# Patient Record
Sex: MALE | Race: WHITE | NOT HISPANIC OR LATINO | URBAN - METROPOLITAN AREA
[De-identification: names, ages, dates, MRNs, and addresses within clinical notes are randomized per-mention and may not be internally consistent; named-entity substitution may affect disease eponyms.]

---

## 2017-01-12 ENCOUNTER — HOSPITAL ENCOUNTER (OUTPATIENT)
Dept: OCCUPATIONAL THERAPY | Facility: CLINIC | Age: 42
Discharge: HOME | End: 2017-01-12
Attending: OCCUPATIONAL THERAPIST | Admitting: OCCUPATIONAL THERAPIST
Payer: COMMERCIAL

## 2017-01-12 ENCOUNTER — ALLIED HEALTH/NURSE VISIT (OUTPATIENT)
Dept: OPHTHALMOLOGY | Facility: CLINIC | Age: 42
End: 2017-01-12
Attending: OPHTHALMOLOGY
Payer: COMMERCIAL

## 2017-01-12 DIAGNOSIS — H51.9 CONVERGENCE INSUFFICIENCY OR PALSY IN BINOCULAR EYE MOVEMENT: Primary | ICD-10-CM

## 2017-01-12 PROCEDURE — 92015 DETERMINE REFRACTIVE STATE: CPT | Mod: ZF

## 2017-01-12 PROCEDURE — 99215 OFFICE O/P EST HI 40 MIN: CPT | Mod: 25,ZF

## 2017-01-12 PROCEDURE — 97530 THERAPEUTIC ACTIVITIES: CPT | Mod: GO | Performed by: OCCUPATIONAL THERAPIST

## 2017-01-12 PROCEDURE — 40000249 ZZH STATISTIC VISIT LOW VISION CLINIC: Performed by: OCCUPATIONAL THERAPIST

## 2017-01-12 PROCEDURE — 97535 SELF CARE MNGMENT TRAINING: CPT | Mod: GO,59 | Performed by: OCCUPATIONAL THERAPIST

## 2017-01-12 ASSESSMENT — VISUAL ACUITY
OS_PH_SC: 20/20
METHOD: SNELLEN - LINEAR
OD_SC: 20/50
OS_SC: 20/30
OD_PH_SC: 20/20

## 2017-01-12 ASSESSMENT — CONF VISUAL FIELD
OS_NORMAL: 1
OD_NORMAL: 1

## 2017-01-12 ASSESSMENT — REFRACTION_MANIFEST
OS_SPHERE: -1.50
OD_CYLINDER: +0.50
OD_SPHERE: -1.50
OS_CYLINDER: +0.25
OD_AXIS: 060
OS_AXIS: 180

## 2017-01-12 NOTE — NURSING NOTE
Chief Complaints and History of Present Illnesses   Patient presents with     Eye Problem     REFRACTION AND PRISMS     HPI    Affected eye(s):  Both   Symptoms:     Blurred vision   Decreased vision      Frequency:  Constant       Do you have eye pain now?:  No      Comments:  Ramon Holcomb is a 41 year old male with the following diagnoses:      1.  Optic atrophy    2.  Subjective visual disturbance        - Saw Deneen Izabella today.   - Here for refraction and prism fitting  - blurred vision OU.   - decreased contrast   - no diplopia per patient account.

## 2017-01-16 ASSESSMENT — TONOMETRY
OS_IOP_MMHG: 22
IOP_METHOD: TONOPEN
OD_IOP_MMHG: 20

## 2017-01-16 ASSESSMENT — REFRACTION_WEARINGRX
OS_AXIS: 005
OS_HPRISM: 1 BI
OD_SPHERE: -1.00
OS_CYLINDER: +0.50
OD_CYLINDER: +0.50
OS_SPHERE: -1.00
OD_AXIS: 023

## 2017-03-07 ENCOUNTER — TELEPHONE (OUTPATIENT)
Dept: OCCUPATIONAL THERAPY | Facility: CLINIC | Age: 42
End: 2017-03-07

## 2017-05-09 NOTE — PROGRESS NOTES
OCCUPATIONAL THERAPY VISUAL REHABILITATION DISCHARGE SUMMARY     Patient: Drake Holcomb  : 1975  Insurance:   Payor/Plan Subscriber Name Rel Member # Group #   [facility] [place] [facility]  [id number]      [address] [address]       Beginning/End Dates of Reporting Period:  2016-2017    Therapy Diagnosis:   Therapy  Diagnosis: Impaired ADL/IADL with deficits in: Reading based ADL, Home management, Grooming, Eating (detail work)       Client Self Report: Pt attended 3 OT visual rehabilitation sessions.  Pt failed to schedule further appointments to complete plan of care. Progress towards goals as below:    GOALS     Goal 1 - Near vision    Goal Description: Near Vision: Patient will verbalize awareness of visual field Loss and demonstrate improved use of visual skills/adaptive equipment for increased independence in reading-based activities of daily living, written communication and detail ADL tasks.       Target Date: 17  Progress to goal: pt failed to make further appointments to complete plan of care.  Goal partially met. Identified 3X stand illuminated magnifier was effective for reading standard print, and computer modifications identified for increased efficiency with computer communications.         Goal 2 - Visual field    Goal Description: Visual field: Patient will verbalize awareness of visual field loss and demonstrate improved use of visual skills for increased safety/ independence in locating objects/obstacles and in navigation       Target Date: 17  Progress to goal: pt failed to make further appointments to complete plan of care.  Goal partially met with identification of change in performance of extrapersonal search (as measured by [employer]) in varied light conditions, and instruction in methods to compensate with visual skill training.         Goal 3 - Environmental modification    Goal Description: Environment modification: Patient will demonstrate  understanding of the impact of lighting, contrast and glare on ADL activities, in conjunction with environmentally-based ADL modifications       Target Date: 02/21/17  Progress to goal: pt failed to make further appointments to complete plan of care.  Pt identified task light increased visual distortion for near tasks, and no task light recommended. Identified filters for managing glare.         Goal 4 - Resource education    Goal Description: Resource education: Patient will verbalize awareness of community resources for the following:, Audio access to print materials, Access to large print materials, Access to low vision devices       Target Date: 02/21/17  Progress to goal: pt failed to make further appointments to complete plan of care. Issued resources for all devices recommended. Pt identified work based counseling service to deal with emotional impact of vision loss.         Goal 5 - Distance viewing    Goal Description: Distance viewing: Patient will demonstrate proficient use of a distance device in conjunction with appropriate visual skills techniques to correctly survey objects in the distance       Target Date: 02/21/17  Goal met with identification of head borne Eschenbach binocular glasses for detail tasks requiring hands free magnification at intermediate distance. Resources were issued.         Goal 6 - Cognition                     Goal 7                 Goal 8                   Progress Toward Goals  Goal(s) partially  met    Reason for Discharge  Patient has failed to schedule further appointments.    Adaptive Equipment/Optical Devices Recommended:  Magnifier for continuous text reading **  Tint for glare management gray  3X stand illuminated magnifier, computer modifications    Discharge Plan  Patient to continue home program/techniques.  Pt verbalized a plan to seek counseling via workplace counseling service.

## 2017-05-09 NOTE — ADDENDUM NOTE
Encounter addended by: Deneen Parekh OT on: 5/9/2017 10:49 AM<BR>     Actions taken: Sign clinical note, Episode resolved

## 2019-11-09 ENCOUNTER — HEALTH MAINTENANCE LETTER (OUTPATIENT)
Age: 44
End: 2019-11-09

## 2020-12-06 ENCOUNTER — HEALTH MAINTENANCE LETTER (OUTPATIENT)
Age: 45
End: 2020-12-06

## 2021-09-26 ENCOUNTER — HEALTH MAINTENANCE LETTER (OUTPATIENT)
Age: 46
End: 2021-09-26

## 2022-01-16 ENCOUNTER — HEALTH MAINTENANCE LETTER (OUTPATIENT)
Age: 47
End: 2022-01-16

## 2023-04-23 ENCOUNTER — HEALTH MAINTENANCE LETTER (OUTPATIENT)
Age: 48
End: 2023-04-23